# Patient Record
(demographics unavailable — no encounter records)

---

## 2025-01-27 NOTE — HISTORY OF PRESENT ILLNESS
[FreeTextEntry1] : Neeta is a 33yoF with PMH pre eclampsia, HLD who presents for   She was recently diagnosed with pre eclampsia (gave birth a few weeks ago) She is now on Nifedipine 30mg BID   At 38 weeks, she was noted to have elevated BP readings and protein in the urine.   BPs at home have been very well controlled and so she decreased her dose to 30mg QD

## 2025-01-27 NOTE — DISCUSSION/SUMMARY
[EKG obtained to assist in diagnosis and management of assessed problem(s)] : EKG obtained to assist in diagnosis and management of assessed problem(s) [FreeTextEntry1] : Neeta is a 33yoF with PMH pre eclampsia, HLD who presents for HTN management.   She is now 3 weeks postpartum and was recently diagnosed with Pre-eclampsia. She has been initiated on Nifedipine 30mg BID which she has decreased to 30mg QD due to very well controlled BPs at home (110s). She will call us in one week with her BP readings and continue 30mg QD for now. We will attempt to wean her BP meds altogether in the next several months.   Given the above and her one episode of palpitations, will proceed with a TTE to rule out any structural abnormalities.   To follow up closely in 8 weeks.

## 2025-03-24 NOTE — CARDIOLOGY SUMMARY
[TextEntry] : EKG 1/27/25: NSR EKG 3/24/25: NSR  TTE 2/2025: 1. Left ventricular systolic function is normal with an ejection fraction visually estimated at 60 to 65 %. There are no regional wall motion abnormalities seen. 2. There is normal LV mass and concentric remodeling. 3. Normal right ventricular cavity size and normal right ventricular systolic function. 4. Structurally normal mitral valve with normal leaflet excursion. 5. Trace mitral regurgitation.

## 2025-03-24 NOTE — DISCUSSION/SUMMARY
[EKG obtained to assist in diagnosis and management of assessed problem(s)] : EKG obtained to assist in diagnosis and management of assessed problem(s) [FreeTextEntry1] : Neeta is a 33yoF with PMH pre eclampsia, HLD who presents for HTN mgmt  She is doing very well from a cardiac standpoint. She denies any symptoms at this time. Her BP is excellent off all her medications at this time.   Her HLD is noted and we have discussed diet and exercise at length. We will repeat her lipid panel in May of 2025.   To follow up in 6 months.

## 2025-03-24 NOTE — HISTORY OF PRESENT ILLNESS
[FreeTextEntry1] : Neeta is a 33yoF with PMH pre eclampsia, HLD who presents for HTN mgmt  BPs at home have been 100s-110s systolic She is off the Nifedipine- we have weaned her off.  122/82 was the highest  She delivered on January 8th 2025 (vaginal delivery), diagnosed with pre eclampsia at that time. Was placed on Nifedipine at that time and sent to us  She is no longer feeling any palpitations   She is now exercising and feeling well